# Patient Record
Sex: MALE | Race: WHITE | Employment: FULL TIME | URBAN - METROPOLITAN AREA
[De-identification: names, ages, dates, MRNs, and addresses within clinical notes are randomized per-mention and may not be internally consistent; named-entity substitution may affect disease eponyms.]

---

## 2017-01-01 ENCOUNTER — TELEPHONE (OUTPATIENT)
Dept: FAMILY MEDICINE | Facility: CLINIC | Age: 72
End: 2017-01-01

## 2017-01-01 ENCOUNTER — TRANSFERRED RECORDS (OUTPATIENT)
Dept: HEALTH INFORMATION MANAGEMENT | Facility: CLINIC | Age: 72
End: 2017-01-01

## 2017-01-01 ENCOUNTER — CARE COORDINATION (OUTPATIENT)
Dept: CARE COORDINATION | Facility: CLINIC | Age: 72
End: 2017-01-01

## 2017-01-01 ENCOUNTER — OFFICE VISIT (OUTPATIENT)
Dept: FAMILY MEDICINE | Facility: CLINIC | Age: 72
End: 2017-01-01
Payer: COMMERCIAL

## 2017-01-01 VITALS
TEMPERATURE: 97.6 F | OXYGEN SATURATION: 99 % | WEIGHT: 179 LBS | HEIGHT: 74 IN | SYSTOLIC BLOOD PRESSURE: 94 MMHG | DIASTOLIC BLOOD PRESSURE: 60 MMHG | BODY MASS INDEX: 22.97 KG/M2 | HEART RATE: 86 BPM

## 2017-01-01 DIAGNOSIS — C23 GALLBLADDER CANCER (H): ICD-10-CM

## 2017-01-01 DIAGNOSIS — E78.5 HYPERLIPIDEMIA LDL GOAL <130: ICD-10-CM

## 2017-01-01 DIAGNOSIS — Z01.818 PREOP GENERAL PHYSICAL EXAM: Primary | ICD-10-CM

## 2017-01-01 DIAGNOSIS — R73.09 IMPAIRED GLUCOSE TOLERANCE TEST: ICD-10-CM

## 2017-01-01 DIAGNOSIS — Z13.220 LIPID SCREENING: ICD-10-CM

## 2017-01-01 DIAGNOSIS — K21.9 GASTROESOPHAGEAL REFLUX DISEASE, ESOPHAGITIS PRESENCE NOT SPECIFIED: ICD-10-CM

## 2017-01-01 DIAGNOSIS — I48.91 ATRIAL FIBRILLATION, UNSPECIFIED TYPE (H): ICD-10-CM

## 2017-01-01 DIAGNOSIS — N40.0 HYPERTROPHY OF PROSTATE WITHOUT URINARY OBSTRUCTION: ICD-10-CM

## 2017-01-01 DIAGNOSIS — C61 PROSTATE CANCER (H): ICD-10-CM

## 2017-01-01 LAB
ALBUMIN SERPL-MCNC: 2.3 G/DL (ref 3.4–5)
ALP SERPL-CCNC: 587 U/L (ref 40–150)
ALT SERPL W P-5'-P-CCNC: 87 U/L (ref 0–70)
ANION GAP SERPL CALCULATED.3IONS-SCNC: 6 MMOL/L (ref 3–14)
AST SERPL W P-5'-P-CCNC: 144 U/L (ref 0–45)
BILIRUB SERPL-MCNC: 3.8 MG/DL (ref 0.2–1.3)
BUN SERPL-MCNC: 12 MG/DL (ref 7–30)
CALCIUM SERPL-MCNC: 8.4 MG/DL (ref 8.5–10.1)
CHLORIDE SERPL-SCNC: 101 MMOL/L (ref 94–109)
CHOLEST SERPL-MCNC: 144 MG/DL
CO2 SERPL-SCNC: 29 MMOL/L (ref 20–32)
CREAT SERPL-MCNC: 0.71 MG/DL (ref 0.66–1.25)
ERYTHROCYTE [DISTWIDTH] IN BLOOD BY AUTOMATED COUNT: 14.5 % (ref 10–15)
GFR SERPL CREATININE-BSD FRML MDRD: ABNORMAL ML/MIN/1.7M2
GLUCOSE SERPL-MCNC: 105 MG/DL (ref 70–99)
HBA1C MFR BLD: 5.8 % (ref 4.3–6)
HCT VFR BLD AUTO: 34 % (ref 40–53)
HDLC SERPL-MCNC: 14 MG/DL
HGB BLD-MCNC: 11.9 G/DL (ref 13.3–17.7)
LDLC SERPL CALC-MCNC: 109 MG/DL
MCH RBC QN AUTO: 34.3 PG (ref 26.5–33)
MCHC RBC AUTO-ENTMCNC: 35 G/DL (ref 31.5–36.5)
MCV RBC AUTO: 98 FL (ref 78–100)
NONHDLC SERPL-MCNC: 130 MG/DL
PLATELET # BLD AUTO: 130 10E9/L (ref 150–450)
POTASSIUM SERPL-SCNC: 3.8 MMOL/L (ref 3.4–5.3)
PROT SERPL-MCNC: 6.3 G/DL (ref 6.8–8.8)
PSA SERPL-MCNC: 0.07 UG/L (ref 0–4)
RBC # BLD AUTO: 3.47 10E12/L (ref 4.4–5.9)
SODIUM SERPL-SCNC: 136 MMOL/L (ref 133–144)
TRIGL SERPL-MCNC: 104 MG/DL
WBC # BLD AUTO: 6.4 10E9/L (ref 4–11)

## 2017-01-01 PROCEDURE — 80061 LIPID PANEL: CPT | Performed by: FAMILY MEDICINE

## 2017-01-01 PROCEDURE — 85027 COMPLETE CBC AUTOMATED: CPT | Performed by: FAMILY MEDICINE

## 2017-01-01 PROCEDURE — 99215 OFFICE O/P EST HI 40 MIN: CPT | Performed by: FAMILY MEDICINE

## 2017-01-01 PROCEDURE — 36415 COLL VENOUS BLD VENIPUNCTURE: CPT | Performed by: FAMILY MEDICINE

## 2017-01-01 PROCEDURE — 80053 COMPREHEN METABOLIC PANEL: CPT | Performed by: FAMILY MEDICINE

## 2017-01-01 PROCEDURE — 84153 ASSAY OF PSA TOTAL: CPT | Performed by: FAMILY MEDICINE

## 2017-01-01 PROCEDURE — 83036 HEMOGLOBIN GLYCOSYLATED A1C: CPT | Performed by: FAMILY MEDICINE

## 2017-01-19 NOTE — PATIENT INSTRUCTIONS
Before Your Surgery      Call your surgeon if there is any change in your health. This includes signs of a cold or flu (such as a sore throat, runny nose, cough, rash or fever).    Do not smoke, drink alcohol or take over the counter medicine (unless your surgeon or primary care doctor tells you to) for the 24 hours before and after surgery.    If you take prescribed drugs: Follow your doctor s orders about which medicines to take and which to stop until after surgery.    Eating and drinking prior to surgery: follow the instructions from your surgeon    Take a shower or bath the night before surgery. Use the soap your surgeon gave you to gently clean your skin. If you do not have soap from your surgeon, use your regular soap. Do not shave or scrub the surgery site.  Wear clean pajamas and have clean sheets on your bed.     Stop xarelto as directed.

## 2017-01-19 NOTE — MR AVS SNAPSHOT
After Visit Summary   1/19/2017    Jose Sarkar    MRN: [id number]           Patient Information     Date Of Birth          1945        Visit Information        Provider Department      1/19/2017 10:40 AM Ignacio Taylor MD [facility]        Today's Diagnoses     Preop general physical exam    -  1     Gallbladder cancer (H)         Atrial fibrillation, unspecified type (H)         Prostate cancer (H)         Gastroesophageal reflux disease, esophagitis presence not specified         Hypertrophy of prostate without urinary obstruction         Impaired glucose tolerance test         Lipid screening         Hyperlipidemia LDL goal <130           Care Instructions      Before Your Surgery      Call your surgeon if there is any change in your health. This includes signs of a cold or flu (such as a sore throat, runny nose, cough, rash or fever).    Do not smoke, drink alcohol or take over the counter medicine (unless your surgeon or primary care doctor tells you to) for the 24 hours before and after surgery.    If you take prescribed drugs: Follow your doctor s orders about which medicines to take and which to stop until after surgery.    Eating and drinking prior to surgery: follow the instructions from your surgeon    Take a shower or bath the night before surgery. Use the soap your surgeon gave you to gently clean your skin. If you do not have soap from your surgeon, use your regular soap. Do not shave or scrub the surgery site.  Wear clean pajamas and have clean sheets on your bed.     Stop xarelto as directed.         Follow-ups after your visit        Who to contact     If you have questions or need follow up information about today's clinic visit or your schedule please contact [facility] directly at [phone].  Normal or non-critical lab and imaging results will be communicated to you by MyChart, letter or phone within 4 business days after the  "clinic has received the results. If you do not hear from us within 7 days, please contact the clinic through [employer] or phone. If you have a critical or abnormal lab result, we will notify you by phone as soon as possible.  Submit refill requests through [employer] or call your pharmacy and they will forward the refill request to us. Please allow 3 business days for your refill to be completed.          Additional Information About Your Visit        [employer] Information     [employer] lets you send messages to your doctor, view your test results, renew your prescriptions, schedule appointments and more. To sign up, go to [email or web address] . Click on \"Log in\" on the left side of the screen, which will take you to the Welcome page. Then click on \"Sign up Now\" on the right side of the page.     You will be asked to enter the access code listed below, as well as some personal information. Please follow the directions to create your username and password.     Your access code is: [email or web address]  Expires: 2017 11:55 AM     Your access code will  in 90 days. If you need help or a new code, please call your [facility] clinic or [phone].        Care EveryWhere ID     This is your Care EveryWhere ID. This could be used by other organizations to access your [facility] medical records  [id number]        Your Vitals Were     Pulse Temperature Height BMI (Body Mass Index) Pulse Oximetry       86 97.6  F (36.4  C) (Oral) 6' 2\" (1.88 m) 22.97 kg/m2 99%        Blood Pressure from Last 3 Encounters:   17 94/60   16 99/68   10/03/16 120/74    Weight from Last 3 Encounters:   17 179 lb (81.194 kg)   16 181 lb 8 oz (82.328 kg)   10/03/16 192 lb 12.8 oz (87.454 kg)              We Performed the Following     CBC with platelets     Comprehensive metabolic panel (BMP + Alb, Alk Phos, ALT, AST, Total. Bili, TP)     HEMOGLOBIN A1C     Lipid panel reflex to direct LDL     PSA, tumor marker        Primary " Care Provider Office Phone # Fax #    Ignacio Taylor [phone]       [facility] [address]  [facility] [address]        Thank you!     Thank you for choosing [facility]  for your care. Our goal is always to provide you with excellent care. Hearing back from our patients is one way we can continue to improve our services. Please take a few minutes to complete the written survey that you may receive in the mail after your visit with us. Thank you!             Your Updated Medication List - Protect others around you: Learn how to safely use, store and throw away your medicines at www.disposemymeds.org.          This list is accurate as of: 1/19/17 11:16 AM.  Always use your most recent med list.                   Brand Name Dispense Instructions for use    acyclovir 800 MG tablet    ZOVIRAX    18 tablet    Take 1 tablet (800 mg) by mouth 3 times daily For 2 days with cold sores outbreak       atorvastatin 10 MG tablet    LIPITOR    90 tablet    Take 1 tablet (10 mg) by mouth daily Take 1 tablet by mouth daily. Last refill needs follow up appointment       bimatoprost 0.01 % Soln    LUMIGAN     Place 1 drop into both eyes At Bedtime       HYDROcodone-acetaminophen 5-325 MG per tablet    NORCO    8 tablet    Take 1 tablet by mouth every 6 hours as needed for other (Moderate to Severe Pain)       metoprolol 50 MG 24 hr tablet    TOPROL-XL    180 tablet    Take 1 tablet (50 mg) by mouth 2 times daily       multivitamin, therapeutic with minerals Tabs tablet      Take 1 tablet by mouth daily       rivaroxaban ANTICOAGULANT 20 MG Tabs tablet    XARELTO    90 tablet    Take 1 tablet (20 mg) by mouth daily (with dinner)       sildenafil 100 MG cap/tab    VIAGRA    8 tablet    Take 1 tablet (100 mg) by mouth daily as needed

## 2017-01-19 NOTE — NURSING NOTE
"Chief Complaint   Patient presents with     Pre-Op Exam       Initial BP 94/60 mmHg  Pulse 86  Temp(Src) 97.6  F (36.4  C) (Oral)  Ht 6' 2\" (1.88 m)  Wt 179 lb (81.194 kg)  BMI 22.97 kg/m2  SpO2 99% Estimated body mass index is 22.97 kg/(m^2) as calculated from the following:    Height as of this encounter: 6' 2\" (1.88 m).    Weight as of this encounter: 179 lb (81.194 kg).  BP completed using cuff size: ally Valdez MA       "

## 2017-01-19 NOTE — PROGRESS NOTES
[address] [address]  [phone]  Dept: [phone]    PRE-OP EVALUATION:  Today's date: 2017    Jose Sarkar (: 1945) presents for pre-operative evaluation assessment as requested by Surya Costa.  He requires evaluation and anesthesia risk assessment prior to undergoing surgery/procedure for treatment of liver stent .  Proposed procedure: stent exchange     Date of Surgery/ Procedure: 2017  Time of Surgery/ Procedure: 10 am   Hospital/Surgical Facility: [facility]   Fax number for surgical facility: [phone]  Primary Physician: Igncaio Taylor  Type of Anesthesia Anticipated: General    Patient has a Health Care Directive or Living Will:  YES scanned in epic    1. NO - Do you have a history of heart attack, stroke, stent, bypass or surgery on an artery in the head, neck, heart or legs?  2. NO - Do you ever have any pain or discomfort in your chest?  3. NO - Do you have a history of  Heart Failure?  4. NO - Are you troubled by shortness of breath when: walking on the level, up a slight hill or at night?  5. NO - Do you currently have a cold, bronchitis or other respiratory infection?  6. NO - Do you have a cough, shortness of breath or wheezing?  7. NO - Do you sometimes get pains in the calves of your legs when you walk?  8. NO - Do you or anyone in your family have previous history of blood clots?  9. NO - Do you or does anyone in your family have a serious bleeding problem such as prolonged bleeding following surgeries or cuts?  10. NO - Have you ever had problems with anemia or been told to take iron pills?  11. NO - Have you had any abnormal blood loss such as black, tarry or bloody stools, or abnormal vaginal bleeding?  12. NO - Have you ever had a blood transfusion?  13. NO - Have you or any of your relatives ever had problems with anesthesia?  14. NO - Do you have sleep apnea, excessive  snoring or daytime drowsiness?  15. NO - Do you have any prosthetic heart valves?  16. NO - Do you have prosthetic joints?  17. NO - Is there any chance that you may be pregnant?      HPI:                                                      Brief HPI related to upcoming procedure: had stent for biliary obstruction that needs to be replaced      Prostate cancer- has follow up with urology upcoming.   A-FIB - Patient has a longstanding history of chronic A-fib currently on rate control . Patient does take xarelto for stroke prevention and denies significant symptoms of lightheadedness, palpitations or dyspnea.                                                                                                                                          .    MEDICAL HISTORY:                                                      Patient Active Problem List    Diagnosis Date Noted     Jaundice 08/19/2016     Priority: Medium     Bilateral low back pain with sciatica, sciatica laterality unspecified 03/23/2016     Priority: Medium     Adenocarcinoma of gallbladder (H)      Priority: Medium     Prostate cancer (H)      Priority: Medium     Gallbladder cancer (H) 11/20/2015     Priority: Medium     Atrial fibrillation (H) 11/06/2015     Priority: Medium     Malignant neoplasm of prostate (H) 10/13/2015     Priority: Medium     Osteoarthritis of CMC joint of thumb 05/02/2014     Advanced directives, counseling/discussion 05/01/2012     Patient states has Advance Directive and will bring in a copy to clinic. 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 Provider to review        Past Medical History    Diagnosis Date     Pure hypercholesterolemia      Hypertrophy of prostate without urinary obstruction and other lower urinary tract symptoms (LUTS)      Atrial fibrillation (H) 20 yrs (~1995)     rate controlled on ASA     Unspecified glaucoma      Glaucoma     Colon polyps      adenoma     Diverticula of colon      Prostate cancer (H)      Adenocarcinoma of gallbladder (H)      Past Surgical History   Procedure Laterality Date     Surgical history of -   2010     removal prostatic stones     Hepatectomy partial N/A 11/20/2015     Procedure: HEPATECTOMY PARTIAL;  Surgeon: Anders Osuna MD;  Location:  OR     Cholecystectomy N/A 11/20/2015     Procedure: CHOLECYSTECTOMY;  Surgeon: Anders Osuna MD;  Location:  OR     Insert port vascular access N/A 3/29/2016     Procedure: INSERT PORT VASCULAR ACCESS;  Surgeon: Griffin Caro MD;  Location:  [facility]     Remove port vascular access Left 7/22/2016     Procedure: REMOVE PORT VASCULAR ACCESS;  Surgeon: Griffin Caro MD;  Location:  OR     Insert port vascular access N/A 11/14/2016     Procedure: INSERT PORT VASCULAR ACCESS;  Surgeon: Griffin Caro MD;  Location:  OR     Current Outpatient Prescriptions   Medication Sig Dispense Refill     HYDROcodone-acetaminophen (NORCO) 5-325 MG per tablet Take 1 tablet by mouth every 6 hours as needed for other (Moderate to Severe Pain) 8 tablet 0     rivaroxaban ANTICOAGULANT (XARELTO) 20 MG TABS tablet Take 1 tablet (20 mg) by mouth daily (with dinner) 90 tablet 0     metoprolol (TOPROL-XL) 50 MG 24 hr tablet Take 1 tablet (50 mg) by mouth 2 times daily 180 tablet 2     sildenafil (VIAGRA) 100 MG tablet Take 1 tablet (100 mg) by mouth daily as needed 8 tablet 5     atorvastatin (LIPITOR) 10 MG tablet Take 1 tablet (10 mg) by mouth daily Take 1 tablet by mouth daily. Last refill needs follow up appointment 90 tablet 3     multivitamin, therapeutic with minerals (THERA-VIT-M)  "TABS Take 1 tablet by mouth daily       bimatoprost (LUMIGAN) 0.01 % SOLN Place 1 drop into both eyes At Bedtime       acyclovir (ZOVIRAX) 800 MG tablet Take 1 tablet (800 mg) by mouth 3 times daily For 2 days with cold sores outbreak 18 tablet 5     OTC products: None, except as noted above    Allergies   Allergen Reactions     Penicillins Unknown      Latex Allergy: NO    Social History   Substance Use Topics     Smoking status: Former Smoker -- 1.50 packs/day for 20 years     Types: Cigarettes     Quit date: 12/14/2000     Smokeless tobacco: Never Used      Comment: quit   2000     Alcohol Use: Yes      Comment: about 1-2/day     History   Drug Use No       REVIEW OF SYSTEMS:                                                    chano weight loss over hte past 6 monthsConstitutional, HEENT, cardiovascular, pulmonary, gi and gu systems are negative, except as otherwise noted.    EXAM:                                                    BP 94/60 mmHg  Pulse 86  Temp(Src) 97.6  F (36.4  C) (Oral)  Ht 6' 2\" (1.88 m)  Wt 179 lb (81.194 kg)  BMI 22.97 kg/m2  SpO2 99%    GENERAL APPEARANCE: healthy, alert and no distress     EYES: Eyes grossly normal to inspection     HENT: ear canals and TM's normal and nose and mouth without ulcers or lesions     NECK: no adenopathy, no asymmetry, masses, or scars and thyroid normal to palpation     RESP: lungs clear to auscultation - no rales, rhonchi or wheezes     CV: regular rates and rhythm, normal S1 S2, no S3 or S4 and no murmur, click or rub -     ABDOMEN: fulness right upper quadrant      MS: extremities normal- no gross deformities noted, no evidence of inflammation in joints, FROM in all extremities.     SKIN: no suspicious lesions or rashes     NEURO: Normal strength and tone, sensory exam grossly normal, mentation intact and speech normal     PSYCH: mentation appears normal. and affect normal/bright    DIAGNOSTICS:                                                    EKG: " atrial fibrillation, rate 80, normal axis, normal intervals, no acute ST/T changes c/w ischemia, no LVH by voltage criteria, unchanged from previous tracings  Results for orders placed or performed in visit on 01/19/17   HEMOGLOBIN A1C   Result Value Ref Range    Hemoglobin A1C 5.8 4.3 - 6.0 %   CBC with platelets   Result Value Ref Range    WBC 6.4 4.0 - 11.0 10e9/L    RBC Count 3.47 (L) 4.4 - 5.9 10e12/L    Hemoglobin 11.9 (L) 13.3 - 17.7 g/dL    Hematocrit 34.0 (L) 40.0 - 53.0 %    MCV 98 78 - 100 fl    MCH 34.3 (H) 26.5 - 33.0 pg    MCHC 35.0 31.5 - 36.5 g/dL    RDW 14.5 10.0 - 15.0 %    Platelet Count 130 (L) 150 - 450 10e9/L        Recent Labs   Lab Test  08/19/16   0650  08/18/16   2054   03/24/16   1401   11/24/15   0720   11/21/15   0400   07/19/13   1018   HGB   --   14.2   --   11.5*   --   10.5*   < >  11.4*   < >   --    PLT   --   214   --    --    --   157   < >  148*   < >   --    INR  1.01   --    --    --    --    --    --   1.17*   < >   --    NA  138  136   < >   --    --   141   < >  141   < >   --    POTASSIUM  3.9  4.2   < >   --    < >  3.3*   < >  4.2   < >   --    CR  1.01  0.77   < >   --    < >  0.59*   < >  0.68   < >   --    A1C   --    --    --    --    --    --    --   5.9   --   5.4    < > = values in this interval not displayed.        IMPRESSION:                                                        ICD-10-CM    1. Preop general physical exam Z01.818    2. Gallbladder cancer (H) C23 CBC with platelets     Comprehensive metabolic panel (BMP + Alb, Alk Phos, ALT, AST, Total. Bili, TP)   3. Atrial fibrillation, unspecified type (H) I48.91    4. Prostate cancer (H) C61 PSA, tumor marker   5. Gastroesophageal reflux disease, esophagitis presence not specified K21.9    6. Hypertrophy of prostate without urinary obstruction N40.0    7. Impaired glucose tolerance test R73.02 HEMOGLOBIN A1C     Lipid panel reflex to direct LDL   8. Lipid screening Z13.220 Lipid panel reflex to direct LDL    9. Hyperlipidemia LDL goal <130 E78.5 Lipid panel reflex to direct LDL        The proposed surgical procedure is considered INTERMEDIATE risk.    REVISED CARDIAC RISK INDEX  The patient has the following serious cardiovascular risks for perioperative complications such as (MI, PE, VFib and 3  AV Block):  No serious cardiac risks  INTERPRETATION: 0 risks: Class I (very low risk - 0.4% complication rate)    The patient has the following additional risks for perioperative complications:  No identified additional risks      RECOMMENDATIONS:                                                        --monitor for urinary retention.  --Patient is to take all scheduled medications on the day of surgery EXCEPT for modifications listed below.    Anticoagulant or Antiplatelet Medication Use  XARELTO: Bleeding risk is at least moderate for this procedure and Dabigatran (Xarelto) should be stopped at least 24 hours prior to procedure.        APPROVAL GIVEN to proceed with proposed procedure, without further diagnostic evaluation       Signed Electronically by: Ignacio Taylor MD    Copy of this evaluation report is provided to requesting physician.    [place] Preop Guidelines

## 2017-01-19 NOTE — TELEPHONE ENCOUNTER
Reason for Call:  Other FYI    Detailed comments: Pt had surgery done by Dr. Mccann from [facility]. ECRP.    Phone Number Patient can be reached at: Home number on file [phone] (home)    Best Time: Anytime    Can we leave a detailed message on this number? YES    Call taken on 1/19/2017 at 3:22 PM by Lacie Jordan

## 2017-01-19 NOTE — Clinical Note
[facility]   [address], [place]  [address]  [phone]      January 20, 2017      Jose Sarkar  [address]   [facility] [place] [facility] [address]              Dear Jose Ross, here are all your lab tests. They are in a fine range for you to have surgery.     Results for orders placed or performed in visit on 01/19/17   HEMOGLOBIN A1C   Result Value Ref Range    Hemoglobin A1C 5.8 4.3 - 6.0 %   Lipid panel reflex to direct LDL   Result Value Ref Range    Cholesterol 144 <200 mg/dL    Triglycerides 104 <150 mg/dL    HDL Cholesterol 14 (L) >39 mg/dL    LDL Cholesterol Calculated 109 (H) <100 mg/dL    Non HDL Cholesterol 130 (H) <130 mg/dL   CBC with platelets   Result Value Ref Range    WBC 6.4 4.0 - 11.0 10e9/L    RBC Count 3.47 (L) 4.4 - 5.9 10e12/L    Hemoglobin 11.9 (L) 13.3 - 17.7 g/dL    Hematocrit 34.0 (L) 40.0 - 53.0 %    MCV 98 78 - 100 fl    MCH 34.3 (H) 26.5 - 33.0 pg    MCHC 35.0 31.5 - 36.5 g/dL    RDW 14.5 10.0 - 15.0 %    Platelet Count 130 (L) 150 - 450 10e9/L   Comprehensive metabolic panel (BMP + Alb, Alk Phos, ALT, AST, Total. Bili, TP)   Result Value Ref Range    Sodium 136 133 - 144 mmol/L    Potassium 3.8 3.4 - 5.3 mmol/L    Chloride 101 94 - 109 mmol/L    Carbon Dioxide 29 20 - 32 mmol/L    Anion Gap 6 3 - 14 mmol/L    Glucose 105 (H) 70 - 99 mg/dL    Urea Nitrogen 12 7 - 30 mg/dL    Creatinine 0.71 0.66 - 1.25 mg/dL    GFR Estimate >90  Non  GFR Calc   >60 mL/min/1.7m2    GFR Estimate If Black >90   GFR Calc   >60 mL/min/1.7m2    Calcium 8.4 (L) 8.5 - 10.1 mg/dL    Bilirubin Total 3.8 (H) 0.2 - 1.3 mg/dL    Albumin 2.3 (L) 3.4 - 5.0 g/dL    Protein Total 6.3 (L) 6.8 - 8.8 g/dL    Alkaline Phosphatase 587 (H) 40 - 150 U/L    ALT 87 (H) 0 - 70 U/L     (H) 0 - 45 U/L   PSA, tumor marker   Result Value Ref Range    PSA 0.07 0 - 4 ug/L           Sincerely,    Ignacio Taylor MD/nr

## 2017-01-20 NOTE — PROGRESS NOTES
[address] [address]  [phone]  Dept: [phone]    PRE-OP EVALUATION:  Today's date: 2017    Jose Sarkar (: 1945) presents for pre-operative evaluation assessment as requested by Dr. Anand.  He requires evaluation and anesthesia risk assessment prior to undergoing surgery/procedure for treatment of stent .  Proposed procedure: stent exchange     Date of Surgery/ Procedure: 2017  Time of Surgery/ Procedure: 10 am   Hospital/Surgical Facility: [facility]   Fax number for surgical facility:    Primary Physician: Ignacio Taylor  Type of Anesthesia Anticipated: General    Patient has a Health Care Directive or Living Will:  YES scanned in epic    1. NO - Do you have a history of heart attack, stroke, stent, bypass or surgery on an artery in the head, neck, heart or legs?  2. NO - Do you ever have any pain or discomfort in your chest?  3. NO - Do you have a history of  Heart Failure?  4. NO - Are you troubled by shortness of breath when: walking on the level, up a slight hill or at night?  5. NO - Do you currently have a cold, bronchitis or other respiratory infection?  6. NO - Do you have a cough, shortness of breath or wheezing?  7. NO - Do you sometimes get pains in the calves of your legs when you walk?  8. NO - Do you or anyone in your family have previous history of blood clots?  9. NO - Do you or does anyone in your family have a serious bleeding problem such as prolonged bleeding following surgeries or cuts?  10. NO - Have you ever had problems with anemia or been told to take iron pills?  11. NO - Have you had any abnormal blood loss such as black, tarry or bloody stools, or abnormal vaginal bleeding?  12. NO - Have you ever had a blood transfusion?  13. NO - Have you or any of your relatives ever had problems with anesthesia?  14. NO - Do you have sleep apnea, excessive snoring or daytime drowsiness?  15. NO - Do you have any  prosthetic heart valves?  16. NO - Do you have prosthetic joints?  17. NO - Is there any chance that you may be pregnant?      HPI:                                                      Brief HPI related to upcoming procedure: had stent for biliary obstruction that needs to be replaced      Prostate cancer- has follow up with urology upcoming.   A-FIB - Patient has a longstanding history of chronic A-fib currently on rate control . Patient does take xarelto for stroke prevention and denies significant symptoms of lightheadedness, palpitations or dyspnea.                                                                                                                                          .    MEDICAL HISTORY:                                                      Patient Active Problem List    Diagnosis Date Noted     Jaundice 08/19/2016     Priority: Medium     Bilateral low back pain with sciatica, sciatica laterality unspecified 03/23/2016     Priority: Medium     Adenocarcinoma of gallbladder (H)      Priority: Medium     Prostate cancer (H)      Priority: Medium     Gallbladder cancer (H) 11/20/2015     Priority: Medium     Atrial fibrillation (H) 11/06/2015     Priority: Medium     Malignant neoplasm of prostate (H) 10/13/2015     Priority: Medium     Osteoarthritis of CMC joint of thumb 05/02/2014     Priority: Medium     Advanced directives, counseling/discussion 05/01/2012     Priority: Medium     Patient states has Advance Directive and will bring in a copy to clinic. 5/1/2012          Colon polyps 01/25/2011     Priority: Medium     GERD (gastroesophageal reflux disease) 01/25/2011     Priority: Medium     Recurrent cold sores 01/25/2011     Priority: Medium     HYPERLIPIDEMIA LDL GOAL <130 10/31/2010     Priority: Medium     Impaired glucose tolerance test 11/30/2007     Priority: Medium     Impotence of organic origin 12/14/2006     Priority: Medium     Hypertrophy of prostate without urinary obstruction  12/09/2005     Priority: Medium     Problem list name updated by automated process. Provider to review        Past Medical History   Diagnosis Date     Pure hypercholesterolemia      Hypertrophy of prostate without urinary obstruction and other lower urinary tract symptoms (LUTS)      Atrial fibrillation (H) 20 yrs (~1995)     rate controlled on ASA     Unspecified glaucoma      Glaucoma     Colon polyps      adenoma     Diverticula of colon      Prostate cancer (H)      Adenocarcinoma of gallbladder (H)      Past Surgical History   Procedure Laterality Date     Surgical history of -   2010     removal prostatic stones     Hepatectomy partial N/A 11/20/2015     Procedure: HEPATECTOMY PARTIAL;  Surgeon: Anders Osuna MD;  Location:  OR     Cholecystectomy N/A 11/20/2015     Procedure: CHOLECYSTECTOMY;  Surgeon: Anders Osuna MD;  Location:  OR     Insert port vascular access N/A 3/29/2016     Procedure: INSERT PORT VASCULAR ACCESS;  Surgeon: Griffin Caro MD;  Location:  [facility]     Remove port vascular access Left 7/22/2016     Procedure: REMOVE PORT VASCULAR ACCESS;  Surgeon: Griffin Caro MD;  Location:  OR     Insert port vascular access N/A 11/14/2016     Procedure: INSERT PORT VASCULAR ACCESS;  Surgeon: Griffin Caro MD;  Location:  OR     Current Outpatient Prescriptions   Medication Sig Dispense Refill     rivaroxaban ANTICOAGULANT (XARELTO) 20 MG TABS tablet Take 1 tablet (20 mg) by mouth daily (with dinner) 90 tablet 0     metoprolol (TOPROL-XL) 50 MG 24 hr tablet Take 1 tablet (50 mg) by mouth 2 times daily 180 tablet 2     sildenafil (VIAGRA) 100 MG tablet Take 1 tablet (100 mg) by mouth daily as needed 8 tablet 5     atorvastatin (LIPITOR) 10 MG tablet Take 1 tablet (10 mg) by mouth daily Take 1 tablet by mouth daily. Last refill needs follow up appointment 90 tablet 3     multivitamin, therapeutic with minerals (THERA-VIT-M) TABS Take 1 tablet by  "mouth daily       bimatoprost (LUMIGAN) 0.01 % SOLN Place 1 drop into both eyes At Bedtime       acyclovir (ZOVIRAX) 800 MG tablet Take 1 tablet (800 mg) by mouth 3 times daily For 2 days with cold sores outbreak 18 tablet 5     HYDROcodone-acetaminophen (NORCO) 5-325 MG per tablet Take 1 tablet by mouth every 6 hours as needed for other (Moderate to Severe Pain) 8 tablet 0     OTC products: None, except as noted above    Allergies   Allergen Reactions     Penicillins Unknown      Latex Allergy: NO    Social History   Substance Use Topics     Smoking status: Former Smoker -- 1.50 packs/day for 20 years     Types: Cigarettes     Quit date: 12/14/2000     Smokeless tobacco: Never Used      Comment: quit   2000     Alcohol Use: Yes      Comment: about 1-2/day     History   Drug Use No       REVIEW OF SYSTEMS:                                                    chano weight loss over hte past 6 monthsConstitutional, HEENT, cardiovascular, pulmonary, gi and gu systems are negative, except as otherwise noted.    EXAM:                                                    BP 94/60 mmHg  Pulse 86  Temp(Src) 97.6  F (36.4  C) (Oral)  Ht 6' 2\" (1.88 m)  Wt 179 lb (81.194 kg)  BMI 22.97 kg/m2  SpO2 99%    GENERAL APPEARANCE: healthy, alert and no distress     EYES: Eyes grossly normal to inspection     HENT: ear canals and TM's normal and nose and mouth without ulcers or lesions     NECK: no adenopathy, no asymmetry, masses, or scars and thyroid normal to palpation     RESP: lungs clear to auscultation - no rales, rhonchi or wheezes     CV: regular rates and rhythm, normal S1 S2, no S3 or S4 and no murmur, click or rub -     ABDOMEN: fulness right upper quadrant      MS: extremities normal- no gross deformities noted, no evidence of inflammation in joints, FROM in all extremities.     SKIN: no suspicious lesions or rashes     NEURO: Normal strength and tone, sensory exam grossly normal, mentation intact and speech normal     " PSYCH: mentation appears normal. and affect normal/bright    DIAGNOSTICS:                                                    EKG: atrial fibrillation, rate 80, normal axis, normal intervals, no acute ST/T changes c/w ischemia, no LVH by voltage criteria, unchanged from previous tracings  Results for orders placed or performed in visit on 01/19/17   HEMOGLOBIN A1C   Result Value Ref Range    Hemoglobin A1C 5.8 4.3 - 6.0 %   Lipid panel reflex to direct LDL   Result Value Ref Range    Cholesterol 144 <200 mg/dL    Triglycerides 104 <150 mg/dL    HDL Cholesterol 14 (L) >39 mg/dL    LDL Cholesterol Calculated 109 (H) <100 mg/dL    Non HDL Cholesterol 130 (H) <130 mg/dL   CBC with platelets   Result Value Ref Range    WBC 6.4 4.0 - 11.0 10e9/L    RBC Count 3.47 (L) 4.4 - 5.9 10e12/L    Hemoglobin 11.9 (L) 13.3 - 17.7 g/dL    Hematocrit 34.0 (L) 40.0 - 53.0 %    MCV 98 78 - 100 fl    MCH 34.3 (H) 26.5 - 33.0 pg    MCHC 35.0 31.5 - 36.5 g/dL    RDW 14.5 10.0 - 15.0 %    Platelet Count 130 (L) 150 - 450 10e9/L   Comprehensive metabolic panel (BMP + Alb, Alk Phos, ALT, AST, Total. Bili, TP)   Result Value Ref Range    Sodium 136 133 - 144 mmol/L    Potassium 3.8 3.4 - 5.3 mmol/L    Chloride 101 94 - 109 mmol/L    Carbon Dioxide 29 20 - 32 mmol/L    Anion Gap 6 3 - 14 mmol/L    Glucose 105 (H) 70 - 99 mg/dL    Urea Nitrogen 12 7 - 30 mg/dL    Creatinine 0.71 0.66 - 1.25 mg/dL    GFR Estimate >90  Non  GFR Calc   >60 mL/min/1.7m2    GFR Estimate If Black >90   GFR Calc   >60 mL/min/1.7m2    Calcium 8.4 (L) 8.5 - 10.1 mg/dL    Bilirubin Total 3.8 (H) 0.2 - 1.3 mg/dL    Albumin 2.3 (L) 3.4 - 5.0 g/dL    Protein Total 6.3 (L) 6.8 - 8.8 g/dL    Alkaline Phosphatase 587 (H) 40 - 150 U/L    ALT 87 (H) 0 - 70 U/L     (H) 0 - 45 U/L   PSA, tumor marker   Result Value Ref Range    PSA 0.07 0 - 4 ug/L        Recent Labs   Lab Test  08/19/16   0650  08/18/16   2054   03/24/16   1401   11/24/15    0720   11/21/15   0400   07/19/13   1018   HGB   --   14.2   --   11.5*   --   10.5*   < >  11.4*   < >   --    PLT   --   214   --    --    --   157   < >  148*   < >   --    INR  1.01   --    --    --    --    --    --   1.17*   < >   --    NA  138  136   < >   --    --   141   < >  141   < >   --    POTASSIUM  3.9  4.2   < >   --    < >  3.3*   < >  4.2   < >   --    CR  1.01  0.77   < >   --    < >  0.59*   < >  0.68   < >   --    A1C   --    --    --    --    --    --    --   5.9   --   5.4    < > = values in this interval not displayed.        IMPRESSION:                                                        ICD-10-CM    1. Preop general physical exam Z01.818    2. Gallbladder cancer (H) C23 CBC with platelets     Comprehensive metabolic panel (BMP + Alb, Alk Phos, ALT, AST, Total. Bili, TP)   3. Atrial fibrillation, unspecified type (H) I48.91    4. Prostate cancer (H) C61 PSA, tumor marker   5. Gastroesophageal reflux disease, esophagitis presence not specified K21.9    6. Hypertrophy of prostate without urinary obstruction N40.0    7. Impaired glucose tolerance test R73.02 HEMOGLOBIN A1C     Lipid panel reflex to direct LDL   8. Lipid screening Z13.220 Lipid panel reflex to direct LDL   9. Hyperlipidemia LDL goal <130 E78.5 Lipid panel reflex to direct LDL        The proposed surgical procedure is considered INTERMEDIATE risk.    REVISED CARDIAC RISK INDEX  The patient has the following serious cardiovascular risks for perioperative complications such as (MI, PE, VFib and 3  AV Block):  No serious cardiac risks  INTERPRETATION: 0 risks: Class I (very low risk - 0.4% complication rate)    The patient has the following additional risks for perioperative complications:  No identified additional risks      RECOMMENDATIONS:                                                        --monitor for urinary retention.  --Patient is to take all scheduled medications on the day of surgery EXCEPT for modifications listed  below.    Anticoagulant or Antiplatelet Medication Use  XARELTO: Bleeding risk is at least moderate for this procedure and Dabigatran (Xarelto) should be stopped at least 24 hours prior to procedure.        APPROVAL GIVEN to proceed with proposed procedure, without further diagnostic evaluation       Signed Electronically by: Ignacio Taylor MD    Copy of this evaluation report is provided to requesting physician.    [facility] Preop Guidelines

## 2017-01-30 NOTE — PROGRESS NOTES
Clinic Care Coordination Contact  OUTREACH    Referral Information:  Referral Source: CTS  Reason for Contact: post discharge from [facility] for sepsis        Universal Utilization:   ED Visits in last year:  (pt goes to [facility] as his ONC is at [facility])  Hospital visits in last year:  (pt goes to [facility] as his ONC is at [facility])  Last PCP appointment: 10/03/16             Clinical Concerns:  Current Medical Concerns: recent hospitalization for sepsis    Current Behavioral Concerns: not addressed   Education Provided to patient: encouraged pt to contact his inf Disease MD for advice on adding Probiotic as pt is on IV vanco and another antiobitc that pt could not remember the name   Pt to contact Inf Disease if he develpes diarrhea with mucous or blood in the stool  Pt is high risk for C Diff       Clinical Pathway: None    Medication Management:  Current Outpatient Prescriptions   Medication     HYDROcodone-acetaminophen (NORCO) 5-325 MG per tablet     rivaroxaban ANTICOAGULANT (XARELTO) 20 MG TABS tablet     metoprolol (TOPROL-XL) 50 MG 24 hr tablet     sildenafil (VIAGRA) 100 MG tablet     atorvastatin (LIPITOR) 10 MG tablet     multivitamin, therapeutic with minerals (THERA-VIT-M) TABS     bimatoprost (LUMIGAN) 0.01 % SOLN     acyclovir (ZOVIRAX) 800 MG tablet     No current facility-administered medications for this visit.         Functional Status:  Mobility Status: Independent     Transportation: independent           Psychosocial:  Current living arrangement:: I live in a private home  Financial/Insurance: medicare       Resources and Interventions:  Current Resources:                   Goals: none at this time as pt is covered for care coordination through [facility]                 Barriers: unknown  Strengths: supportive family  Patient/Caregiver understanding: exwife= yes           Plan:   RN CC reviewed EMR and Care Coordination is no longer needed at this time  Pt closed to RN CC  at this time  Cheri Ascencio RN Clinic Care Coordinator  [facility] [phone]

## 2017-02-13 NOTE — PROGRESS NOTES
Clinic Care Coordination Contact  [facility]/Voicemail    Referral Source: [facility]  Clinical Data: Care Coordinator Outreach  Outreach attempted x 1.  Left message on voicemail with call back information and requested return call.  Plan: Care Coordinator will mail out care coordination introduction letter with care coordinator contact information and explanation of care coordination services. Care Coordinator will try to reach patient again in 1-2 business days.  Cheri Ascencio RN Clinic Care Coordinator  [facility] [phone]

## 2017-02-13 NOTE — LETTER
[facility]ARE [facility]IN[facility]  [address] [facility] [address]  Phone: [phone]      February 13, 2017      Jose Sarkar  [address]   [place] [address]    Dear Jose,    We have been trying to reach you to introduce you to [facility] Care Coordination program.  The goal of care coordination is to help you manage your health and improve access to the [facility] system in the most efficient manner.  The Care Coordinator is a nurse who understands the healthcare system and will assist you in improving your access to care.     As your Physician and Care Coordinator we partner to help you achieve your health care goals.     We will continue to reach out; however, if you are able to call your Care Coordinator at [phone], that would be appreciated.  We at [facility] are focused on providing you with the highest-quality healthcare experience possible.      It is a pleasure to partner with you as we work towards achieving your optimal state of wellness.        Sincerely,        Ignacio Dietz RN, MD  [place] [address] [address] [facility] [address]

## 2017-02-14 NOTE — PROGRESS NOTES
Clinic Care Coordination Contact  [facility]/Voicemail    Referral Source: [facility]  Clinical Data: Care Coordinator Outreach  Outreach attempted x 2.  Left message on voicemail with call back information and requested return call.  Plan: Care Coordinator mailed out care coordination introduction letter on 2/13/17. Care Coordinator will try to reach patient again in 3-5 business days.  Cheri Ascencio RN Clinic Care Coordinator  [facility] [phone]

## 2017-02-16 NOTE — PROGRESS NOTES
RN CC reviewed EMR and Care Coordination is no longer needed at this time  Pt closed to RN CC at this time  Cheri Ascencio RN Clinic Care Coordinator  [facility] [phone]

## 2017-02-16 NOTE — PROGRESS NOTES
Clinic Care Coordination Contact    Dr Brandon YODER CC had been unable to contact pt since notification that he had been discharged last Saturday   Today Jose's family reports that he is back as IP at [facility] and will be there a while  Family reports that there are some health decisions that will need to be made in the near future  Cheri Ascencio RN Clinic Care Coordinator  [facility] [phone]

## 2017-02-20 NOTE — PROGRESS NOTES
Clinic Care Coordination Contact  Care Team Conversations    Patient d/c'd from [facility] on 02/18/17 to [facility].    Dx of Gallbladder cancer 11/2015, currently stage IIIA, as well as prostate cancer.    Will not open to clinic care coordination.   Routing to PCP as FYI, may be receiving paperwork from Hospice that will require review and sig.   Marleny Lozano RN  Clinic Care Coordinator  [facility]  [phone]